# Patient Record
Sex: MALE | Race: BLACK OR AFRICAN AMERICAN | NOT HISPANIC OR LATINO | URBAN - METROPOLITAN AREA
[De-identification: names, ages, dates, MRNs, and addresses within clinical notes are randomized per-mention and may not be internally consistent; named-entity substitution may affect disease eponyms.]

---

## 2020-10-17 ENCOUNTER — LAB VISIT (OUTPATIENT)
Dept: PRIMARY CARE CLINIC | Facility: OTHER | Age: 70
End: 2020-10-17
Attending: INTERNAL MEDICINE
Payer: OTHER GOVERNMENT

## 2020-10-17 DIAGNOSIS — Z03.818 ENCOUNTER FOR OBSERVATION FOR SUSPECTED EXPOSURE TO OTHER BIOLOGICAL AGENTS RULED OUT: Primary | ICD-10-CM

## 2020-10-17 PROCEDURE — U0003 INFECTIOUS AGENT DETECTION BY NUCLEIC ACID (DNA OR RNA); SEVERE ACUTE RESPIRATORY SYNDROME CORONAVIRUS 2 (SARS-COV-2) (CORONAVIRUS DISEASE [COVID-19]), AMPLIFIED PROBE TECHNIQUE, MAKING USE OF HIGH THROUGHPUT TECHNOLOGIES AS DESCRIBED BY CMS-2020-01-R: HCPCS

## 2020-10-18 LAB — SARS-COV-2 RNA RESP QL NAA+PROBE: NOT DETECTED

## 2024-11-21 ENCOUNTER — OFFICE VISIT (OUTPATIENT)
Dept: PODIATRY | Facility: CLINIC | Age: 74
End: 2024-11-21
Payer: MEDICARE

## 2024-11-21 DIAGNOSIS — B35.1 ONYCHOMYCOSIS DUE TO DERMATOPHYTE: ICD-10-CM

## 2024-11-21 DIAGNOSIS — I50.9 CONGESTIVE HEART FAILURE, UNSPECIFIED HF CHRONICITY, UNSPECIFIED HEART FAILURE TYPE: ICD-10-CM

## 2024-11-21 DIAGNOSIS — G20.A1 PARKINSON'S DISEASE, UNSPECIFIED WHETHER DYSKINESIA PRESENT, UNSPECIFIED WHETHER MANIFESTATIONS FLUCTUATE: ICD-10-CM

## 2024-11-21 DIAGNOSIS — I87.2 VENOUS INSUFFICIENCY OF BOTH LOWER EXTREMITIES: ICD-10-CM

## 2024-11-21 DIAGNOSIS — N40.0 BENIGN PROSTATIC HYPERPLASIA, UNSPECIFIED WHETHER LOWER URINARY TRACT SYMPTOMS PRESENT: ICD-10-CM

## 2024-11-21 DIAGNOSIS — I10 HYPERTENSION, UNSPECIFIED TYPE: ICD-10-CM

## 2024-11-21 DIAGNOSIS — L84 CORN OR CALLUS: Primary | ICD-10-CM

## 2024-11-21 DIAGNOSIS — N18.32 STAGE 3B CHRONIC KIDNEY DISEASE: ICD-10-CM

## 2024-11-21 PROCEDURE — 99999 PR PBB SHADOW E&M-NEW PATIENT-LVL II: CPT | Mod: PBBFAC,,, | Performed by: PODIATRIST

## 2024-11-21 NOTE — PROGRESS NOTES
Subjective:       Patient ID: James Milligan is a 74 y.o. male.    Chief Complaint: Foot Swelling (Foot swelling: pt rates 6/10 pain, pt is nondiabetic, pt is wearing tennis shoes )      HPI: James Milligan presents to the office with the chief complaint of elongated, thickened and dystrophic nail plates to the B/L foot. This patient does have PVD. Patient does follow with Primary Care for management of comorbid states. This patient's PMD is No primary care provider on file. This patient last saw his/her primary care provider on 11/1/2024. Wife is present.     Review of patient's allergies indicates:  No Known Allergies    History reviewed. No pertinent past medical history.    No family history on file.    Social History     Socioeconomic History    Marital status:      Social Drivers of Health     Financial Resource Strain: Low Risk  (4/4/2024)    [facility] [employer] [facility]    Overall Financial Resource Strain (CARDIA)     Difficulty of Paying Living Expenses: Not very hard   Food Insecurity: Food Insecurity Present (4/4/2024)    [facility] from [employer] [facility] and [facility]ates    Hunger Vital Sign     Worried About Running Out of Food in the Last Year: Sometimes true     Ran Out of Food in the Last Year: Sometimes true   Transportation Needs: No Transportation Needs (4/4/2024)    Received from [employer] [facility] and Its Subsidiaries and Affiliates    PRAPARE - Transportation     Lack of Transportation (Medical): No     Lack of Transportation (Non-Medical): No   Physical Activity: Sufficiently Active (4/4/2024)    Received from [employer] [facility] and Its Subsidiaries and Affiliates    Exercise Vital Sign     Days of Exercise per Week: 5 days     Minutes of Exercise per Session: 30 min   Housing Stability: Low Risk   (4/4/2024)    [facility] Subsidiaries and Affiliates    Housing Stability Vital Sign     Unable to Pay for Housing in the Last Year: No     Number of Places Lived in the Last Year: 1     Unstable Housing in the Last Year: No       History reviewed. No pertinent surgical history.    Review of Systems   Unable to perform ROS: Other          Objective:   There were no vitals taken for this visit.    Physical Exam  LOWER EXTREMITY PHYSICAL EXAMINATION    VASCULAR: Capillary refill time is WNL. Spider veins are noted to the bilateral lower extremity. Edema is noted, moderate, pitting. Proximal to distal temperature gradient is WNL. Palpation of pulses to the lower extremity is diminished on the left and right. The left dorsalis pedis pulse is 1/4 and on the right is 1/4. The left posterior tibial pulse is 0/4 on the right is 0/4. Hair growth is diminished to absent on the bilateral dorsal foot and at the digits.     DERMATOLOGY: There are nail changes which are consistent with onychomycosis on the left and right foot. On the left, nails 1, 2, 3, 4, and 5 are thickened, are dystrophic, with yellow discoloration, and with malodorous subungual debris. On the right, nails 1, 2, 3, 4, and 5 are thickened, are dystrophic, with yellow discoloration, and with malodorous subungual debris. These thickened and dystrophic nails are painful to touch and palpation. The remaining nail plates are elongated, and are not suggestive of onychomycosis. Callus formation, RLE, 1st toe. Hyperpigmentation is noted.    Assessment:     1. Corn or callus    2. Onychomycosis due to dermatophyte    3. Benign prostatic hyperplasia, unspecified whether lower urinary tract symptoms present    4. Stage 3b chronic kidney disease    5. Hypertension, unspecified type    6. Venous insufficiency of both lower extremities    7. Congestive heart failure, unspecified HF chronicity, unspecified heart  failure type    8. Parkinson's disease, unspecified whether dyskinesia present, unspecified whether manifestations fluctuate        Plan:     Corn or callus  Hypertrophic skin formation, as outlined within the examination portion of this note, is surgically debrided with sharp #10/#15 blade, to alleviate discomfort with weight bearing and ambulation, and to lessen the possibility of skin complications, e.g., ulceration due to pressure. No ulceration(s) is are noted with/post debridement. The lesion is completed healed and resolved. No evidence of infection.     Onychomycosis due to dermatophyte  Onychomycotic nail plates, as outlined above, are sharply debrided with double action nail nipper, and/or with the assistance of a mechanical rotary jake for reduction of pains. Nails are reduced in terms of length, width and girth with removal of subungual debris to facilitate pain free weight bearing and ambulation. Elongated nails as outlined in the objective portion of this note, were trimmed to appropriate length for alleviation/reduction of pains as well. Follow up in approx. 4-6 months.    Benign prostatic hyperplasia, unspecified whether lower urinary tract symptoms present    Stage 3b chronic kidney disease    Hypertension, unspecified type    Venous insufficiency of both lower extremities  PO diuretics with mild gradient compression stockings.    Prescription is written for Orthotist evaluation at [facility], [address] [place] [address], for lower extremity orthotic(s) management and/or shoe gear management.    Congestive heart failure, unspecified HF chronicity, unspecified heart failure type    Parkinson's disease, unspecified whether dyskinesia present, unspecified whether manifestations fluctuate            No future appointments.

## 2025-03-06 ENCOUNTER — OFFICE VISIT (OUTPATIENT)
Dept: PODIATRY | Facility: CLINIC | Age: 75
End: 2025-03-06
Payer: MEDICARE

## 2025-03-06 VITALS — BODY MASS INDEX: 27.17 KG/M2 | WEIGHT: 205 LBS | HEIGHT: 73 IN

## 2025-03-06 DIAGNOSIS — I50.9 CONGESTIVE HEART FAILURE, UNSPECIFIED HF CHRONICITY, UNSPECIFIED HEART FAILURE TYPE: ICD-10-CM

## 2025-03-06 DIAGNOSIS — I87.2 VENOUS INSUFFICIENCY OF BOTH LOWER EXTREMITIES: ICD-10-CM

## 2025-03-06 DIAGNOSIS — B35.3 TINEA PEDIS OF RIGHT FOOT: Primary | ICD-10-CM

## 2025-03-06 DIAGNOSIS — N18.32 STAGE 3B CHRONIC KIDNEY DISEASE: ICD-10-CM

## 2025-03-06 DIAGNOSIS — G20.A1 PARKINSON'S DISEASE, UNSPECIFIED WHETHER DYSKINESIA PRESENT, UNSPECIFIED WHETHER MANIFESTATIONS FLUCTUATE: ICD-10-CM

## 2025-03-06 DIAGNOSIS — B35.1 ONYCHOMYCOSIS DUE TO DERMATOPHYTE: ICD-10-CM

## 2025-03-06 DIAGNOSIS — L84 CORN OR CALLUS: ICD-10-CM

## 2025-03-06 PROCEDURE — 99999 PR PBB SHADOW E&M-EST. PATIENT-LVL III: CPT | Mod: PBBFAC,,, | Performed by: PODIATRIST

## 2025-03-06 RX ORDER — CLOPIDOGREL BISULFATE 75 MG/1
75 TABLET ORAL
COMMUNITY

## 2025-03-06 RX ORDER — PANTOPRAZOLE SODIUM 40 MG/1
1 TABLET, DELAYED RELEASE ORAL EVERY MORNING
COMMUNITY
Start: 2025-02-04

## 2025-03-06 RX ORDER — POTASSIUM CHLORIDE 1500 MG/1
20 TABLET, EXTENDED RELEASE ORAL
COMMUNITY

## 2025-03-06 RX ORDER — DAPAGLIFLOZIN 10 MG/1
10 TABLET, FILM COATED ORAL
COMMUNITY

## 2025-03-06 RX ORDER — BETAMETHASONE DIPROPIONATE 0.5 MG/G
OINTMENT, AUGMENTED TOPICAL 2 TIMES DAILY
COMMUNITY
Start: 2024-10-25

## 2025-03-06 RX ORDER — BUMETANIDE 2 MG/1
2 TABLET ORAL 2 TIMES DAILY
COMMUNITY

## 2025-03-06 RX ORDER — CICLOPIROX 7.7 MG/G
GEL TOPICAL 2 TIMES DAILY
Qty: 45 G | Refills: 2 | Status: SHIPPED | OUTPATIENT
Start: 2025-03-06

## 2025-03-06 RX ORDER — METOPROLOL TARTRATE 100 MG/1
0.5 TABLET ORAL
COMMUNITY

## 2025-03-06 RX ORDER — AMLODIPINE BESYLATE 5 MG/1
1 TABLET ORAL EVERY MORNING
COMMUNITY

## 2025-03-06 RX ORDER — BRIMONIDINE TARTRATE 2 MG/ML
1 SOLUTION/ DROPS OPHTHALMIC 3 TIMES DAILY
COMMUNITY
Start: 2025-02-24

## 2025-03-06 RX ORDER — LANCETS
EACH MISCELLANEOUS
COMMUNITY
Start: 2024-04-05

## 2025-03-06 RX ORDER — HYDROXYZINE HYDROCHLORIDE 50 MG/1
50 TABLET, FILM COATED ORAL NIGHTLY PRN
COMMUNITY
Start: 2024-12-03

## 2025-03-06 RX ORDER — INSULIN GLARGINE 300 [IU]/ML
45 INJECTION, SOLUTION SUBCUTANEOUS
COMMUNITY
Start: 2024-09-10

## 2025-03-06 RX ORDER — LOSARTAN POTASSIUM 100 MG/1
100 TABLET ORAL
COMMUNITY

## 2025-03-06 RX ORDER — DULOXETIN HYDROCHLORIDE 60 MG/1
1 CAPSULE, DELAYED RELEASE ORAL EVERY MORNING
COMMUNITY

## 2025-03-06 RX ORDER — CARBIDOPA AND LEVODOPA 10; 100 MG/1; MG/1
1 TABLET ORAL 3 TIMES DAILY
COMMUNITY
Start: 2024-09-30

## 2025-03-06 RX ORDER — AMANTADINE HYDROCHLORIDE 100 MG/1
100 CAPSULE, GELATIN COATED ORAL 3 TIMES DAILY
COMMUNITY
Start: 2025-01-20

## 2025-03-06 NOTE — PROGRESS NOTES
Subjective:       Patient ID: James Milligan is a 74 y.o. male.    Chief Complaint: Foot Pain (BLE foot pain. Diabetic. 5/10 pain rating. Pt wears tennis shoes and socks. Pt last PCP appt was 2/4/2025 with Dr Liv Gillette.)    HPI: James Milligan presents to the office with the chief complaint of elongated, thickened and dystrophic nail plates to the B/L foot. Also states maceration with callus to the RLE 4th webspace. This patient does have PVD. Patient does follow with Primary Care for management of comorbid states. This patient's PMD is Sheri Gillette MD This patient last saw his/her primary care provider on 2/25/2025. Wife is present.     Review of patient's allergies indicates:   Allergen Reactions    Cyclobenzaprine Anxiety       Past Medical History:   Diagnosis Date    Anxiety     Diabetes mellitus, type 2     Heart disease     Pacemaker     Parkinson's disease        Family History   Problem Relation Name Age of Onset    Aneurysm Mother      Lung cancer Father         Social History     Socioeconomic History    Marital status:    Tobacco Use    Smoking status: Never    Smokeless tobacco: Never     Social Drivers of Health     Financial Resource Strain: Low Risk  (4/4/2024)    Received from [employer] [facility] Subsidiaries and Affiliates    Overall Financial Resource Strain (CARDIA)     Difficulty of Paying Living Expenses: Not very hard   Food Insecurity: Food Insecurity Present (4/4/2024)    Received from [employer] [facility] Subsidiaries and Affiliates    Hunger Vital Sign     Worried About Running Out of Food in the Last Year: Sometimes true     Ran Out of Food in the Last Year: Sometimes true   Transportation Needs: No Transportation Needs (4/4/2024)    Received from [facility] Subsidiaries and Affiliates    PRAPARE - Transportation     Lack of Transportation  "(Medical): No     Lack of Transportation (Non-Medical): No   Physical Activity: Sufficiently Active (4/4/2024)    [facility] from [facility]idi[facility] and Affiliates    Exercise Vital Sign     Days of Exercise per Week: 5 days     Minutes of Exercise per Session: 30 min   Housing Stability: Low Risk  (4/4/2024)    Received from [facility] Subsidiaries and Affiliates    Housing Stability Vital Sign     Unable to Pay for Housing in the Last Year: No     Number of Places Lived in the Last Year: 1     Unstable Housing in the Last Year: No       Past Surgical History:   Procedure Laterality Date    CARDIAC PACEMAKER PLACEMENT      CORONARY ARTERY BYPASS GRAFT      EYE SURGERY      HERNIA REPAIR      PROSTATE SURGERY         Review of Systems   Unable to perform ROS: Other        Objective:   Ht 6' 1" (1.854 m)   Wt 93 kg (205 lb)   BMI 27.05 kg/m²     Physical Exam  LOWER EXTREMITY PHYSICAL EXAMINATION    VASCULAR: Capillary refill time is WNL. Spider veins are noted to the bilateral lower extremity. Edema is noted, moderate, pitting. Proximal to distal temperature gradient is WNL. Palpation of pulses to the lower extremity is diminished on the left and right. The left dorsalis pedis pulse is 1/4 and on the right is 1/4. The left posterior tibial pulse is 0/4 on the right is 0/4. Hair growth is diminished to absent on the bilateral dorsal foot and at the digits.     DERMATOLOGY: There are nail changes which are consistent with onychomycosis on the left and right foot. On the left, nails 1, 2, 3, 4, and 5 are thickened, are dystrophic, with yellow discoloration, and with malodorous subungual debris. On the right, nails 1, 2, 3, 4, and 5 are thickened, are dystrophic, with yellow discoloration, and with malodorous subungual debris. These thickened and dystrophic nails are painful to touch and palpation. The remaining nail " plates are elongated, and are not suggestive of onychomycosis. Maceration is noted, RLE 4th webspace. Callus formation is noted to the B/L 1st great toe.     Assessment:     1. Tinea pedis of right foot    2. Onychomycosis due to dermatophyte    3. Stage 3b chronic kidney disease    4. Venous insufficiency of both lower extremities    5. Congestive heart failure, unspecified HF chronicity, unspecified heart failure type    6. Parkinson's disease, unspecified whether dyskinesia present, unspecified whether manifestations fluctuate    7. Corn or callus        Plan:     Tinea pedis of right foot  -     ciclopirox 0.77 % Gel; Apply topically 2 (two) times daily.  Dispense: 45 g; Refill: 2    Onychomycosis due to dermatophyte    Stage 3b chronic kidney disease    Venous insufficiency of both lower extremities    Congestive heart failure, unspecified HF chronicity, unspecified heart failure type    Parkinson's disease, unspecified whether dyskinesia present, unspecified whether manifestations fluctuate    Corn or callus       Thorough discussion is had with the patient today, concerning the diagnosis, its etiology, and the treatment algorithm at present.     Onychomycotic nail plates, as outlined above, are sharply debrided with double action nail nipper, and/or with the assistance of a mechanical rotary jake for reduction of pains. Nails are reduced in terms of length, width and girth with removal of subungual debris to facilitate pain free weight bearing and ambulation. Elongated nails as outlined in the objective portion of this note, were trimmed to appropriate length for alleviation/reduction of pains as well. Follow up in approx. 4-6 months.    Hypertrophic skin formation, as outlined within the examination portion of this note, is surgically debrided with sharp #10/#15 blade, to alleviate discomfort with weight bearing and ambulation, and to lessen the possibility of skin complications, e.g., ulceration due to  pressure. No ulceration(s) is are noted with/post debridement. The lesion is completed healed and resolved. No evidence of infection.             No future appointments.